# Patient Record
Sex: FEMALE | Race: WHITE
[De-identification: names, ages, dates, MRNs, and addresses within clinical notes are randomized per-mention and may not be internally consistent; named-entity substitution may affect disease eponyms.]

---

## 2023-05-26 PROBLEM — Z00.00 ENCOUNTER FOR PREVENTIVE HEALTH EXAMINATION: Status: ACTIVE | Noted: 2023-05-26

## 2023-05-30 ENCOUNTER — APPOINTMENT (OUTPATIENT)
Dept: OTOLARYNGOLOGY | Facility: CLINIC | Age: 78
End: 2023-05-30
Payer: MEDICARE

## 2023-05-30 VITALS — HEIGHT: 65 IN | BODY MASS INDEX: 32.49 KG/M2 | WEIGHT: 195 LBS

## 2023-05-30 PROCEDURE — 31231 NASAL ENDOSCOPY DX: CPT

## 2023-05-30 PROCEDURE — 99203 OFFICE O/P NEW LOW 30 MIN: CPT | Mod: 25

## 2023-05-30 RX ORDER — IPRATROPIUM BROMIDE 21 UG/1
0.03 SPRAY NASAL 3 TIMES DAILY
Qty: 1 | Refills: 5 | Status: ACTIVE | COMMUNITY
Start: 2023-05-30 | End: 1900-01-01

## 2023-06-25 NOTE — PHYSICAL EXAM
[FreeTextEntry1] : \par The patient was alert and oriented and in no distress.\par Voice was clear.\par \par Face:\par The patient had no facial asymmetry or mass.\par The skin was unremarkable.\par \par Eyes:\par The pupils were equal round and reactive to light and accommodation.\par There was no significant nystagmus or disconjugate gaze noted.\par \par Nose: \par The external nose had no significant deformity.  There was no facial tenderness.  On anterior rhinoscopy, the nasal mucosa was clear.  The anterior septum was midline.  There were no visualized polyps purulence  or masses.  She had an iatrogenically narrowed nasal passages\par \par Oral cavity:\par The oral mucosa was normal.\par The oral and base of tongue were clear and without mass.\par The gingival and buccal mucosa were moist and without lesions.\par The palate moved well.\par There was no cleft to the palate.\par There appeared to be good salivary flow.  \par There was no pus, erythema or mass in the oral cavity.\par \par \par Ears:\par The external ears were normal without deformity.\par The ear canals were clear.\par The tympanic membranes were intact and normal.\par \par Neck: \par The neck was symmetrical.\par The parotid and submandibular glands were normal without masses.\par The trachea was midline and there was no unusual crepitus.\par The thyroid was smooth and nontender and no masses were palpated.\par There was no significant cervical adenopathy.\par \par \par Neuro:\par Neurologically, the patient was awake, alert, and oriented to person, place and time. There were no obvious focal neurologic abnormalities.  Cranial nerves II through XII were grossly intact.\par \par \par TMJ:\par The temporomandibular joints were nontender.\par There was no abnormal crepitus and no significant malocclusion\par  [de-identified] : Nasal endoscopy: \par CPT 57468\par Procedure Note:\par \par Endoscopy was done with Covid precautions and with video. All risks and benefits were discussed with the patient and consent obtained.\par \par Nasal endoscopy was done with topical anesthesia of Pontocaine and Afrin and a      nasal endoscope.\par Indication: Nasal congestion, rule out sinusitis.\par Procedure: The nasal cavity was anesthetized with topical Afrin and Pontocaine. An  endoscope was used and inserted into the nasal cavity.\par Attention was first paid to the anterior nasal cavity.\par Endocoscopy was performed to inspect the interior of the nasal cavity, the nasal septum,  the middle and superior meati, the inferior, middle and superior turbinates, and the spheno-ethmoidal  recesses, the nasopharynx and eustachian tube orifices bilaterally. including the nasal mocosa, the possibility of polyps and the consistency of the nasal mucous.\par All findings were normal except:\par The mucosa is flat and watery, the nasal cavity iatrogenically narrowed and she has a subtotal septal perforation without inflammatory change\par \par \par Flexible fiberoptic laryngoscopy: CPT 18387\par Indications: Dysphagia\par Procedure note:\par  \par Flexible fiberoptic laryngoscopy was performed because of dysphagia and the patient's inability to tolerate adequate mirror examination.\par The nasal cavity was anesthetized with Pontocaine plus Afrin.\par \par \par The nasal cavity was anesthetized with Pontocaine and Afrin.\par The flexible endoscope was placed into the patient's nasal cavity.\par The nasopharynx was without masses.\par The oropharynx, vallecula and base of tongue had no masses.\par The epiglottis, aryepiglottic folds and false vocal cords were normal.\par The pyriform sinuses were without mucosal lesions or pooling of secretions.  \par The laryngeal ventricles were without lesions.\par The visualized subglottis was without mass.\par The lateral and posterior pharyngeal walls were clear and symmetrical.\par The vocal folds were clear and mobile; they abducted and adducted normally.\par There was no interarytenoid mass or fullness.\par There was significant posterior laryngeal inflammation consistent with reflux.\par \par Endoscopy was done with Covid precautions and with video. All risks and benefits were discussed with the patient and consent obtained.

## 2023-06-25 NOTE — REVIEW OF SYSTEMS
[Negative] : Heme/Lymph [de-identified] : postnasal drip [de-identified] : excessive phlegm, trouble swallowing

## 2023-06-25 NOTE — HISTORY OF PRESENT ILLNESS
[de-identified] : LETTY SCHMID is a 77 year old female who comes in complaining of 2 to 3 months of feeling that food is stuck in her throat.  She points to a level in her upper esophagus.  Food does pass eventually and she does not bring food up.  This is worse with solids than liquids but she also feels this with pills.  She does have a history of reflux and takes pantoprazole.  Sometimes she will wake up at night with a burning that responds to Gaviscon she also complains of a bothersome watery nasal discharge whenever she eats.  The patient had no other ear nose or throat complaints at this visit.

## 2023-06-25 NOTE — ADDENDUM
[FreeTextEntry1] : 6/25/23   large hiatal hernia with marked reflux and diminished esophageal motiltity.  RLP

## 2023-06-25 NOTE — ASSESSMENT
[FreeTextEntry1] : It was my impression that this represents a vasomotor rhinitis.  The pathogenesis was discussed.  I recommended a trial of Atrovent 06% if needed  and the option of ClariFlex was discussed.  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  She has significant reflux but is not following a good antireflux diet.  I so recommended.  She is elevating the foot of her bed at night for her edema and I suggested also elevating if that is possible.\par I suggested famotidine at bedtime and a cervical esophagram.  I asked that she speak to her gastroenterologist as well and would like to see her back in follow-up in 4 to 6 weeks

## 2023-06-27 ENCOUNTER — APPOINTMENT (OUTPATIENT)
Dept: OTOLARYNGOLOGY | Facility: CLINIC | Age: 78
End: 2023-06-27
Payer: MEDICARE

## 2023-06-27 VITALS — WEIGHT: 195 LBS | HEIGHT: 65 IN | BODY MASS INDEX: 32.49 KG/M2

## 2023-06-27 DIAGNOSIS — K21.9 GASTRO-ESOPHAGEAL REFLUX DISEASE W/OUT ESOPHAGITIS: ICD-10-CM

## 2023-06-27 DIAGNOSIS — J34.89 OTHER SPECIFIED DISORDERS OF NOSE AND NASAL SINUSES: ICD-10-CM

## 2023-06-27 DIAGNOSIS — J30.0 VASOMOTOR RHINITIS: ICD-10-CM

## 2023-06-27 DIAGNOSIS — K44.9 DIAPHRAGMATIC HERNIA W/OUT OBSTRUCTION OR GANGRENE: ICD-10-CM

## 2023-06-27 DIAGNOSIS — K21.9 DIAPHRAGMATIC HERNIA W/OUT OBSTRUCTION OR GANGRENE: ICD-10-CM

## 2023-06-27 DIAGNOSIS — R13.14 DYSPHAGIA, PHARYNGOESOPHAGEAL PHASE: ICD-10-CM

## 2023-06-27 PROCEDURE — 99214 OFFICE O/P EST MOD 30 MIN: CPT

## 2023-06-27 NOTE — ASSESSMENT
[FreeTextEntry1] : It was my impression that her vasomotor rhinitis was improved with Atrovent and I recommended continuing to use as needed.  The option of Clarifix was discussed but not recommended.\par \par \par I reviewed her esophagram with her.  She is shows a quite large hiatal hernia with the lower esophageal sphincter in the chest and reflux as high as the clavicles with diminished motility.  I recommended sitting up after eating, elevation of the head of the bed.  Continuing on reflux medications but I strongly recommend a GI evaluation at this point as well.  I would like to see her back in follow-up after the above.

## 2023-06-27 NOTE — PHYSICAL EXAM
[FreeTextEntry1] : \par The patient was alert and oriented and in no distress.\par Voice was clear.\par \par Face:\par The patient had no facial asymmetry or mass.\par The skin was unremarkable.\par \par Eyes:\par The pupils were equal round and reactive to light and accommodation.\par There was no significant nystagmus or disconjugate gaze noted.\par \par Nose: \par The external nose had no significant deformity.  There was no facial tenderness.  On anterior rhinoscopy, the nasal mucosa was clear.  The anterior septum was midline.  There were no visualized polyps purulence  or masses.  She had an iatrogenically narrowed nasal passages\par \par Oral cavity:\par The oral mucosa was normal.\par The oral and base of tongue were clear and without mass.\par The gingival and buccal mucosa were moist and without lesions.\par The palate moved well.\par There was no cleft to the palate.\par There appeared to be good salivary flow.  \par There was no pus, erythema or mass in the oral cavity.\par \par \par Ears:\par The external ears were normal without deformity.\par The ear canals were clear.\par The tympanic membranes were intact and normal.\par \par Neck: \par The neck was symmetrical.\par The parotid and submandibular glands were normal without masses.\par The trachea was midline and there was no unusual crepitus.\par The thyroid was smooth and nontender and no masses were palpated.\par There was no significant cervical adenopathy.\par \par \par Neuro:\par Neurologically, the patient was awake, alert, and oriented to person, place and time. There were no obvious focal neurologic abnormalities.  Cranial nerves II through XII were grossly intact.\par \par \par TMJ:\par The temporomandibular joints were nontender.\par There was no abnormal crepitus and no significant malocclusion\par

## 2023-06-27 NOTE — REVIEW OF SYSTEMS
[Negative] : Heme/Lymph [de-identified] : postnasal drip [de-identified] : excessive phlegm, trouble swallowing

## 2023-06-27 NOTE — HISTORY OF PRESENT ILLNESS
[de-identified] : LETTY SCHMID was seen in follow-up on June 27.  She really was not feeling much better.  In the interim, she had the cervical esophagram and comes in for review.  The patient had no other ear nose or throat complaints at this visit.  She notes a significant improvement in her vasomotor rhinitis with the Atrovent.

## 2023-12-15 ENCOUNTER — RX RENEWAL (OUTPATIENT)
Age: 78
End: 2023-12-15

## 2024-02-14 ENCOUNTER — RX CHANGE (OUTPATIENT)
Age: 79
End: 2024-02-14

## 2024-02-14 RX ORDER — FAMOTIDINE 40 MG/1
40 TABLET, FILM COATED ORAL
Qty: 90 | Refills: 3 | Status: ACTIVE | COMMUNITY
Start: 1900-01-01 | End: 1900-01-01

## 2024-02-14 RX ORDER — FAMOTIDINE 40 MG/1
40 TABLET, FILM COATED ORAL
Qty: 30 | Refills: 5 | Status: DISCONTINUED | COMMUNITY
Start: 2023-05-30 | End: 2024-02-14

## 2025-04-18 ENCOUNTER — RX RENEWAL (OUTPATIENT)
Age: 80
End: 2025-04-18